# Patient Record
Sex: MALE | URBAN - METROPOLITAN AREA
[De-identification: names, ages, dates, MRNs, and addresses within clinical notes are randomized per-mention and may not be internally consistent; named-entity substitution may affect disease eponyms.]

---

## 2017-05-15 ENCOUNTER — TELEPHONE (OUTPATIENT)
Dept: NURSING | Facility: CLINIC | Age: 22
End: 2017-05-15

## 2017-05-16 NOTE — TELEPHONE ENCOUNTER
Call Type: Triage Call    Presenting Problem: Caller is mother who states that  dorina had  excision of an ingrown toenail 1 week ago and the entire toe is  purple  and tender without any  improvement in pain; No  drainagae  or redness ; concern that is infected.  Has been wearing closed toe  shoes daily since procedure.  Advised to be assessed  within 24 hrs  by any provider for evaluation for  infection and advised to avoid  closed toe shoes until seen.  Call  if neworworsening symptoms.  Triage Note:  Guideline Title: Foot Non-Injury  Recommended Disposition: See Provider within 8 Hours  Original Inclination: Wanted to speak with a nurse  Override Disposition:  Intended Action: See /Agustin Appt  Physician Contacted: No  Any signs and symptoms of soft tissue infection that have not improved with 48  hours of medical care ?  YES  Pain in toe OR discoloration after exposure to cold AND DOES NOT return to normal  with warming ? NO  Orthopedic hardware (metal plate, violetta or screw) newly bulging under or through  skin ? NO  Unable to remove rings after using soap, lotion, oil, petroleum jelly or other  lubricants AND causing pain, discoloration or severe swelling ? NO  Gradual onset or worsening numbness/tingling ? NO  New marked swelling (twice the normal size as compared to usual appearance) ? NO  Sudden onset of severe or deep aching pain with associated numbness or weakness ?  NO  New onset foot drop or weakness with sensory changes (numbness, tingling) ? NO  New onset of severe pain AND change in sensation (numb, tingling, or no feeling),  change in color (pale or blue), feels cool to touch compared to other extremity.  ? NO  Any foot problems AND has diabetes ? NO  New onset of inability to take unassisted weight-bearing steps ? NO  New onset of unbearable pain within last 24 hours ? NO  Extremity swelling or limitation of range of motion AND known bleeding disorder OR  taking blood thinner, chemotherapy or  transplant medications ? NO  Any new OR worsening signs and symptoms of soft tissue infection ? NO  New unexplained weakness/paralysis, change in sensation (numbness or tingling) or  inability to purposely move, especially when one side of body is involved  occurring now or within last 4 hours ? NO  Physician Instructions:  Care Advice: Another adult should drive.  Call provider if symptoms worsen or new symptoms develop.  Elevate part to improve circulation and decrease discomfort.  CAUTIONS  SYMPTOM / CONDITION MANAGEMENT  List, or take, all current prescription(s), nonprescription or alternative  medication(s) to provider for evaluation.  Analgesic/Antipyretic Advice - NSAIDs: Consider aspirin, ibuprofen,  naproxen or ketoprofen for pain or fever as directed on label or by  pharmacist/provider. PRECAUTIONS: - You should not take this medicine for  more than 10 days unless recommended by your provider. EXCEPTIONS: - Should  not be used if taking blood thinners or have bleeding problems. - Do not  use if have history of sensitivity/allergy to any of these medications  or history of cardiovascular, ulcer, kidney, liver disease or diabetes  unless approved by provider. - Do not exceed recommended dose or frequency.  Analgesic/Antipyretic Advice - Acetaminophen: Consider acetaminophen as  directed on label or by pharmacist/provider for pain or fever. PRECAUTIONS:  - Use if there is no history of liver disease, alcoholism, or intake of  three or more alcohol drinks per day - Only if approved by provider during  pregnancy or when breastfeeding - Do not exceed recommended dose or  frequency. Do not take more than 3000 milligrams (mg) in 24 hours. Do not  take this medicine for more than 10 days unless recommended by your  provider. - During pregnancy, acetaminophen should not be taken more than 3  consecutive days without telling provider - To make sure you don't take too  much, check other medicines you take to see if  they also contain  acetaminophen.